# Patient Record
Sex: MALE | Race: BLACK OR AFRICAN AMERICAN | Employment: PART TIME | URBAN - METROPOLITAN AREA
[De-identification: names, ages, dates, MRNs, and addresses within clinical notes are randomized per-mention and may not be internally consistent; named-entity substitution may affect disease eponyms.]

---

## 2019-05-07 ENCOUNTER — OFFICE VISIT (OUTPATIENT)
Dept: FAMILY MEDICINE CLINIC | Age: 24
End: 2019-05-07

## 2019-05-07 VITALS
BODY MASS INDEX: 30.49 KG/M2 | HEIGHT: 70 IN | HEART RATE: 56 BPM | WEIGHT: 213 LBS | OXYGEN SATURATION: 97 % | RESPIRATION RATE: 16 BRPM | DIASTOLIC BLOOD PRESSURE: 95 MMHG | SYSTOLIC BLOOD PRESSURE: 132 MMHG | TEMPERATURE: 98.8 F

## 2019-05-07 DIAGNOSIS — S93.402A SPRAIN OF LEFT ANKLE, UNSPECIFIED LIGAMENT, INITIAL ENCOUNTER: Primary | ICD-10-CM

## 2019-05-07 NOTE — PATIENT INSTRUCTIONS
As many of you know, [place] is expanding Medicaid in January of 2019. Many residents who applied and were denied in the past will now qualify for Medicaid. Under the new system, qualification for non-disabled, non-pregnant adults will be based on income level. The income cut off is listed below, but the only way to know if you may qualify is to apply. You can submit an application online at [email or web address], in person at [employer], or by phone at [phone] Income cut off Single person                                     $16.754  or less per year       ($1,397 or less per month) Family of two                                     $22,715 or less per year         ($1,894 or less per month) Family of three                                   $28.677 or less per year         ($2.391 or less per month) Family of four                                     $34,638 or less per year         ($2,887 or less per month) Ankle Sprain: Rehab Exercises Your Care Instructions Here are some examples of typical rehabilitation exercises for your condition. Start each exercise slowly. Ease off the exercise if you start to have pain. Your doctor or physical therapist will tell you when you can start these exercises and which ones will work best for you. How to do the exercises \"Alphabet\" exercise 1. Trace the alphabet with your toe. This helps your ankle move in all directions. Side-to-side knee swing exercise 1. Sit in a chair with your foot flat on the floor. 2. Slowly move your knee from side to side. Keep your foot pressed flat. 3. Continue this exercise for 2 to 3 minutes. Towel curl 1. While sitting, place your foot on a towel on the floor. Scrunch the towel toward you with your toes. 2. Then use your toes to push the towel away from you. 3. To make this exercise more challenging you can put something on the other end of the towel. A can of soup is about the right weight for this. Towel stretch 1. Sit with your legs extended and knees straight. 2. Place a towel around your foot just under the toes. 3. Hold each end of the towel in each hand, with your hands above your knees. 4. Pull back with the towel so that your foot stretches toward you. 5. Hold the position for at least 15 to 30 seconds. 6. Repeat 2 to 4 times a session. Do up to 5 sessions a day. Ankle eversion exercise 1. Start by sitting with your foot flat on the floor. Push your foot outward against a wall or a piece of furniture that doesn't move. Hold for about 6 seconds, and relax. Repeat 8 to 12 times. 2. After you feel comfortable with this, try using rubber tubing looped around the outside of your feet for resistance. Push your foot out to the side against the tubing, and then count to 10 as you slowly bring your foot back to the middle. Repeat 8 to 12 times. Isometric opposition exercises 1. While sitting, put your feet together flat on the floor. 2. Press your injured foot inward against your other foot. Hold for about 6 seconds, and relax. Repeat 8 to 12 times. 3. Then place the heel of your other foot on top of the injured one. Push down with the top heel while trying to push up with your injured foot. Hold for about 6 seconds, and relax. Repeat 8 to 12 times. Resisted ankle inversion 1. Sit on the floor with your good leg crossed over your other leg. 2. Hold both ends of an exercise band and loop the band around the inside of your affected foot. Then press your other foot against the band. 3. Keeping your legs crossed, slowly push your affected foot against the band so that foot moves away from your other foot. Then slowly relax. 4. Repeat 8 to 12 times. Resisted ankle eversion 1. Sit on the floor with your legs straight. 2. Hold both ends of an exercise band and loop the band around the outside of your affected foot. Then press your other foot against the band. 3. Keeping your leg straight, slowly push your affected foot outward against the band and away from your other foot without letting your leg rotate. Then slowly relax. 4. Repeat 8 to 12 times. Resisted ankle dorsiflexion 1. Tie the ends of an exercise band together to form a loop. Attach one end of the loop to a secure object or shut a door on it to hold it in place. (Or you can have someone hold one end of the loop to provide resistance.) 2. While sitting on the floor or in a chair, loop the other end of the band over the top of your affected foot. 3. Keeping your knee and leg straight, slowly flex your foot to pull back on the exercise band, and then slowly relax. 4. Repeat 8 to 12 times. Single-leg balance 1. Stand on a flat surface with your arms stretched out to your sides like you are making the letter \"T. \" Then lift your good leg off the floor, bending it at the knee. If you are not steady on your feet, use one hand to hold on to a chair, counter, or wall. 2. Standing on the leg with your affected ankle, keep that knee straight. Try to balance on that leg for up to 30 seconds. Then rest for up to 10 seconds. 3. Repeat 6 to 8 times. 4. When you can balance on your affected leg for 30 seconds with your eyes open, try to balance on it with your eyes closed. 5. When you can do this exercise with your eyes closed for 30 seconds and with ease and no pain, try standing on a pillow or piece of foam, and repeat steps 1 through 4. Follow-up care is a key part of your treatment and safety. Be sure to make and go to all appointments, and call your doctor if you are having problems. It's also a good idea to know your test results and keep a list of the medicines you take. Where can you learn more? Go to http://chintan-yaw.info/. Abelardo Lindo in the search box to learn more about \"Ankle Sprain: Rehab Exercises. \" Current as of: September 20, 2018 Content Version: 11.9 © 6604-2592 [employer]. Care instructions adapted under license by [employer] ([employer]) (which disclaims liability or warranty for this information). If you have questions about a medical condition or this instruction, always ask your healthcare professional. [place] [address] any warranty or liability for your use of this information.

## 2019-05-07 NOTE — PROGRESS NOTES
Chief Complaint Patient presents with  Ankle Injury \"pt stated that he injury his ankle 4 years ago\"

## 2019-05-07 NOTE — PROGRESS NOTES
HPI 
Teja Recinos is a 20 y.o. male being seen today for Chief Complaint Patient presents with  Ankle Injury \"pt stated that he injury his ankle 4 years ago\" [place] IOV to this pt to care a Tahir Oquendo.   he states that 4 years ago he broke his foot/ankle and he was in a cast for awhile. He was lost to follow up but was doing ok for awhile. Augusto [place] it has been hurting him, especially in the evenings after working all day. At work he is walking, standing a lot and unloading trucks. Seen in ED recently and had xray showing nothing acute. Using naproxen bid. Hx AURORA and not on any treatment at this time. Dx at [facility] about 10 years ago and lost his cpap since then. Past Medical History:  
Diagnosis Date  AURORA (obstructive sleep apnea)   
 dx at [facility] ROS Patient states that he is feeling well. Denies complaints of chest pain, shortness of breath, swelling of legs, dizziness or weakness. he denies nausea, vomiting or diarrhea. No current outpatient medications on file. No current facility-administered medications for this visit. PE Visit Vitals BP (!) 132/95 (BP 1 Location: Left arm, BP Patient Position: Sitting) Pulse (!) 56 Temp 98.8 °F (37.1 °C) (Temporal) Resp 16 Ht 5' 10\" (1.778 m) Wt 213 lb (96.6 kg) SpO2 97% BMI 30.56 kg/m² Alert and oriented with normal mood and affect. he is well developed and well nourished Examination of the left lower extremity showed no visible abnormalities, redness, or trauma to the skin. There was no tenderness to palpation of the medial or lateral malleolus. No tenderness of the medial foot or heel, and no tenderness along the 5th metatarsal. Ankle active and passive  range of motion was grossly normal and without pain. Upon ankle extension and inversion patient notes that the stretched dorsal lateral aspect of the foot is where he typically feels pain. Gait normal .No results found for this or any previous visit. Assessment and Plan: ICD-10-CM ICD-9-CM 1. Sprain of left ankle, unspecified ligament, initial encounter S93.402A 845.00 Start home exercises for ankle Apply for medicaid Follow up after he knows if he will receive medicaid and we will place his referrals at that time, either through medicaid or [facility] financial assistance. Will need PT, PCP, sleep study Jalil Dsouza MD

## 2019-07-18 ENCOUNTER — TELEPHONE (OUTPATIENT)
Dept: FAMILY MEDICINE CLINIC | Age: 24
End: 2019-07-18

## 2019-07-18 ENCOUNTER — OFFICE VISIT (OUTPATIENT)
Dept: FAMILY MEDICINE CLINIC | Age: 24
End: 2019-07-18

## 2019-07-18 VITALS
OXYGEN SATURATION: 98 % | RESPIRATION RATE: 18 BRPM | HEART RATE: 52 BPM | SYSTOLIC BLOOD PRESSURE: 148 MMHG | TEMPERATURE: 98 F | BODY MASS INDEX: 30.84 KG/M2 | HEIGHT: 70 IN | WEIGHT: 215.4 LBS | DIASTOLIC BLOOD PRESSURE: 83 MMHG

## 2019-07-18 DIAGNOSIS — S93.402A SPRAIN OF LEFT ANKLE, UNSPECIFIED LIGAMENT, INITIAL ENCOUNTER: Primary | ICD-10-CM

## 2019-07-18 NOTE — PROGRESS NOTES
LALY Sandoval Yassine is a 20 y.o. male being seen today for   Chief Complaint   Patient presents with    Ankle Pain     Left   . follow up for this pt with chronic ankle pain/sprain. he states that he lost his paper with home exercises on it and would like a new copy. Also would like to just go ahead with PT. Thinks he will qualify for medicaid. Past Medical History:   Diagnosis Date    Asthma     AURORA (obstructive sleep apnea)     dx [address]  Patient states that he is feeling well. Denies complaints of chest pain, shortness of breath, swelling of legs, dizziness or weakness. he denies nausea, vomiting or diarrhea. Current Outpatient Medications   Medication Sig    naproxen (NAPROSYN) 375 mg tablet Take 1 Tab by mouth two (2) times daily (with meals). No current facility-administered medications for this visit. PE  Visit Vitals  /83 (BP 1 Location: Left arm, BP Patient Position: Sitting)   Pulse (!) 52   Temp 98 °F (36.7 °C) (Temporal)   Resp 18   Ht 5' 10\" (1.778 m)   Wt 215 lb 6.4 oz (97.7 kg)   SpO2 98%   BMI 30.91 kg/m²        Alert and oriented with normal mood and affect. he is well developed and well nourished . Lungs are clear without wheezing. Heart rate is regular without murmurs or gallops. There is no lower extremity edema. No results found for this or any previous visit. Assessment and Plan:        ICD-10-CM ICD-9-CM    1.  Sprain of left ankle, unspecified ligament, initial encounter S93.402A 845.00 REFERRAL TO PHYSICAL THERAPY     Place pt referral  Await medicaid    Javier Stinson MD

## 2019-07-18 NOTE — PATIENT INSTRUCTIONS
Ankle: Exercises  Your Care Instructions  Here are some examples of exercises for your ankle. Start each exercise slowly. Ease off the exercise if you start to have pain. Your doctor or physical therapist will tell you when you can start these exercises and which ones will work best for you. How to do the exercises  \"Alphabet\" exercise    1. Trace the alphabet with your toe. This helps your ankle move in all directions. Side-to-side knee swing exercise    1. Sit in a chair with your foot flat on the floor. 2. Slowly move your knee from side to side while keeping your foot pressed flat. 3. Continue this exercise for 2 to 3 minutes. Towel curl    1. While sitting, place your foot on a towel on the floor and scrunch the towel toward you with your toes. 2. Then use your toes to push the towel away from you. 3. Make this exercise more challenging by placing a weighted object, such as a soup can, on the other end of the towel. Towel stretch    1. Sit with your legs extended and knees straight. 2. Place a towel around your foot just under the toes. 3. Hold each end of the towel in each hand, with your hands above your knees. 4. Pull back with the towel so that your foot stretches toward you. 5. Hold the position for at least 15 to 30 seconds. 6. Repeat 2 to 4 times a session, up to 5 sessions a day. Ankle eversion exercise    1. Start by sitting with your foot flat on the floor and pushing it outward against an immovable object such as the wall or heavy furniture. Hold for about 6 seconds, then relax. Repeat 8 to 12 times. 2. After you feel comfortable with this, try using rubber tubing looped around the outside of your feet for resistance. Push your foot out to the side against the tubing, and then count to 10 as you slowly bring your foot back to the middle. Repeat 8 to 12 times. Isometric opposition exercises    1. While sitting, put your feet together flat on the floor.   2. Press your injured foot inward against your other foot. Hold for about 6 seconds, and relax. Repeat 8 to 12 times. 3. Then place the heel of your other foot on top of the injured one. Push down with the top heel while trying to push up with your injured foot. Hold for about 6 seconds, and relax. Repeat 8 to 12 times. Follow-up care is a key part of your treatment and safety. Be sure to make and go to all appointments, and call your doctor if you are having problems. It's also a good idea to know your test results and keep a list of the medicines you take. Where can you learn more? Go to http://chintan-yaw.info/. Enter W343 in the search box to learn more about \"Ankle: Exercises. \"  Current as of: September 20, 2018  Content Version: 11.9  © 4682-7240 [employer]. Care instructions adapted under license by [employer] (which disclaims liability or warranty for this information). If you have questions about a medical condition or this instruction, always ask your healthcare professional. [address] any warranty or liability for your use of this information.

## 2019-07-18 NOTE — TELEPHONE ENCOUNTER
Patient advised of appointment for physical therapy  [facility]  7/31/19 3:45 arrive 3:15 [address] [facility] [id number]

## 2019-07-18 NOTE — PROGRESS NOTES
Discharge instructions reviewed with patient    Medication list and understanding of medications reviewed with patient. OTC and herbal medications reviewed and added to med list if applicable  Barriers to adherence assessed. Guidance given regarding new medications this visit, including reason for taking this medicine, and common side effects. AVS given to patient. Explained to patient. Patient expressed understanding.

## 2019-07-31 ENCOUNTER — HOSPITAL ENCOUNTER (OUTPATIENT)
Dept: PHYSICAL THERAPY | Age: 24
Discharge: HOME | End: 2019-07-31
Payer: MEDICAID

## 2019-07-31 PROCEDURE — 97161 PT EVAL LOW COMPLEX 20 MIN: CPT

## 2019-07-31 PROCEDURE — 97110 THERAPEUTIC EXERCISES: CPT

## 2019-07-31 NOTE — PROGRESS NOTES
PHYSICAL THERAPY - DAILY TREATMENT NOTE    Patient Name: Cassy Mckeon        Date: 2019  : 1995   yes Patient  Verified  Visit #:   1     Insurance: Payor: SELF PAY / Plan: [employer] / Product Type: Self Pay /      In time: 3:53 Out time: 4:45   Total Treatment Time: 52     Medicare/[facility] Time Tracking (below)   Total Timed Codes (min):  na 1:1 Treatment Time:  na     TREATMENT AREA =  Left ankle pain [M25.572]    SUBJECTIVE  Pain Level (on 0 to 10 scale):  7/ 10   Medication Changes/New allergies or changes in medical history, any new surgeries or procedures?    no  If yes, update Summary List   Subjective Functional Status/Changes:  []  No changes reported     See POC          OBJECTIVE    12 min Therapeutic Exercise:  [x]  See flow sheet   Rationale:      increase ROM, increase strength, improve balance and increase proprioception to improve the patients ability to ambulate, stand prolonged periods of time     Billed With/As:   [x] TE   [] TA   [] Neuro   [] Self Care Patient Education: [x] Review HEP    [] Progressed/Changed HEP based on:   [] positioning   [] body mechanics   [] transfers   [] heat/ice application    [x] other: orthotic and footwear recommendations     Other Objective/Functional Measures:    See POC     Post Treatment Pain Level (on 0 to 10) scale:    10     ASSESSMENT  Assessment/Changes in Function:     See POC     []  See Progress Note/Recertification   Patient will continue to benefit from skilled PT services to modify and progress therapeutic interventions, address functional mobility deficits, address ROM deficits, address strength deficits, analyze and address soft tissue restrictions, analyze and cue movement patterns, analyze and modify body mechanics/ergonomics, assess and modify postural abnormalities, address imbalance/dizziness and instruct in home and community integration to attain remaining goals.    Progress toward goals / Updated goals:    See POC     PLAN  []  Upgrade activities as tolerated yes Continue plan of care   []  Discharge due to :    []  Other:      Therapist: Juani Zamarripa PT    Date: 7/31/2019 Time: 5:19 PM     Future Appointments   Date Time Provider Philomena Cast   8/16/2019 10:00 AM [facility] PT [facility] [place] 1 [facility]

## 2019-08-01 NOTE — PROGRESS NOTES
[address] [facility] AT [address], [address] [place] [address] Road  Phone: [phone]   [phone]  PLAN OF CARE / [address] [place] [address] PHYSICAL THERAPY SERVICES  Patient Name: Cassy Mckeon : 1995   Medical   Diagnosis: Sprain of L ankle [S93.402A] Treatment Diagnosis: Left ankle pain [M25.572]   Onset Date:      Referral Source: Leron Schirmer, MD [facility]): 2019   Prior Hospitalization: See medical history Provider #: [id number]   Prior Level of Function: Intermittent B ankle pain causing limited ability to participate in recreational activity   Comorbidities: asthma   Medications: Verified on Patient Summary List   The Plan of Care and following information is based on the information from the initial evaluation.   ===========================================================================================  Assessment / key information:  Pt is a 19 y/o M who presents to PT w/ c/o chronic L ankle pain that began in  after an IV ankle sprain while landing from a jump shot in basketball. Pt reports he was placed in a hard cast for 2-3 weeks following initial injury and was told he had made a full recovery. Pt notes continued pain, however, which has worsened over the last 2-3 months due to prolonged standing/walking for work. Pt reports going to ED about 2 months ago due to inc pain and swelling in the L lateral ankle- x-ray (-) for fx. Pt c/o pain ranging 0-10/10, made worse w/ prolonged standing, walking and when attempting to walk after prolonged sitting. C/o instability in the L ankle when ambulating on uneven surfaces and stairs. Localizes pain to L lateral ankle. Denies red flags. Clinical exam significant for:  OBSERVATION: Noted significant pes planus, B forefoot abduction, severe navicular drop B. Pt wearing non-supportive slip on sandals, reports wearing Vans sneakers to work for 8+ hour shift.   ROM: L ankle AROM 10 deg DF, 45 deg PF, 9 deg IV p!, 5 deg EV p! (R ankle AROM WNL all planes). Gastroc flexibility 0 deg DF B w/ knee extended  STRENGTH: ankle IV/EV L 4-/5 2' p!. Unable to perform full range SL HR. DL HR 15x  PALPATION: TTP to the L ATFL, gastroc/soleus, and peroneals  SPECIAL TEST: (+) anterior drawer L. SLS L 5\" w/ marked ankle, hip, and ultimately stepping strategy. FOTO 49/100. Pt sx consistent w/ lateral ankle sprain specifically involving the L ATFL. Pt educated on dx, prognosis, anatomy related to dx, POC, HEP, and footwear recommendations. Advised pt consider OTC or semi-custom orthotics to improve foot/ankle alignment.   ===========================================================================================  Eval Complexity: History LOW Complexity : Zero comorbidities / personal factors that will impact the outcome / POC;  Examination  MEDIUM Complexity : 3 Standardized tests and measures addressing body structure, function, activity limitation and / or participation in recreation ; Presentation MEDIUM Complexity : Evolving with changing characteristics ;   Decision Making MEDIUM Complexity : FOTO score of 26-74; Overall Complexity LOW   Problem List: pain affecting function, decrease ROM, decrease strength, impaired gait/ balance, decrease ADL/ functional abilitiies, decrease activity tolerance, decrease flexibility/ joint mobility and decrease transfer abilities   Treatment Plan may include any combination of the following: Therapeutic exercise, Therapeutic activities, Neuromuscular re-education, Physical agent/modality, Gait/balance training, Manual therapy, Patient education, Self Care training, Functional mobility training, Home safety training and Stair training  Patient / Family readiness to learn indicated by: asking questions, trying to perform skills and interest  Persons(s) to be included in education: patient (P)  Barriers to Learning/Limitations: yes;  financial  Measures taken, if barriers to learning: Pt w/o insurance coverage (actively pursuing Medicaid coverage) and will be self-pay rate. Pt states he cannot afford regular attendance. Established HEP, will f/u in 2 weeks to assess compliance and response and will progress at that point as needed   Patient Goal (s): \"my injury to get better\"   Patient self reported health status: excellent  Rehabilitation Potential: good   Short Term Goals: To be accomplished in  2  weeks:  1. Pt will be I and compliant w/ initial HEP  2. Improve L ankle IV/EV AROM to WNL in order to improve ankle mobility   Long Term Goals: To be accomplished in  4  weeks:  1. Pt will perform 15x SL HR full range on the L in order to improve ability to negotiate stairs and return to light athletic activity  2. Pt will maintain SLS on level ground x 20\" on the L in order to improve ankle stability for ambulation  3. Improve FOTO score to >/= 69/100 to indicate improved function    Frequency / Duration:  Recommend patient to be seen  2  times per week for 4  weeks, however pt may be limited in attendance due to financial/insurance reasons  Patient / Caregiver education and instruction: exercises and other footwear recommendations  Therapist Signature: Daysi Grider PT Date: 8/5/6317   Certification Period: na Time: 9:30 AM   ===========================================================================================  I certify that the above Physical Therapy Services are being furnished while the patient is under my care. I agree with the treatment plan and certify that this therapy is necessary. Physician Signature:        Date:       Time:     Please sign and return to [facility] or you may fax the signed copy to [phone]. Thank you.

## 2019-09-10 ENCOUNTER — HOSPITAL ENCOUNTER (OUTPATIENT)
Dept: PHYSICAL THERAPY | Age: 24
Discharge: HOME | End: 2019-09-10
Payer: MEDICAID

## 2019-09-10 PROCEDURE — 97110 THERAPEUTIC EXERCISES: CPT

## 2019-09-10 NOTE — PROGRESS NOTES
[address] [facility] PHYSICAL THERAPY  [address], [facility], [address] - Phone: [phone]  Fax: [phone]  PROGRESS NOTE  Patient Name: Croin Pihcardo : 1995   Treatment/Medical Diagnosis: Left ankle pain [M25.572]   Referral Source: Kaity Holguin MD     Date of Initial Visit: 19 Attended Visits: 2 Missed Visits: 1     SUMMARY OF TREATMENT  Physical therapy has consisted of therapeutic exercises for increased ankle/foot strength, ROM, and flexibility. Manual therapy for decreased muscle hypertonicity and increased ROM/flexibility. Pt education provided regarding HEP and orthotic use. CURRENT STATUS  Pt returns for PT today after 6 weeks off of therapy due to financial constraints (pt is self pay). He reports inconsistency with HEP exercises. Pt states he was able to obtain OTC orthotic which was helping with his pain, however he has recently stopped using due to concern for appropriate use (pt has been ed on continued use of orthotic at all times for optimal foot support). Pt c/o ongoing pain to L ankle/foot with additional pain to R ankle/foot which he feels is related to compensations with gait and activities. Pt states pain is worst with ambulation after prolonged sitting/sleeping. States he has not attempted running/gym activities. Current objective impairments:    Max pain 10/10 (after sitting/NWB for a period of time). Least 2-3/10. Average pain 7-8/10. Postural impairments: Significant L>R pes planus and bunion. TTP to plantar fascia, posterior tib, calcaneo-fib ligament, peroneals. Decreased flexibility to gastroc/soleus and plantar fascia.   FOTO 39 (-10 point change from [facility])          AROM                 PROM            Strength (1-5)   Right Left Right Left Right Left   Dorsiflexion 5 4 10 7 5 5   Plantarflexion 30 30* 30 30* NT 3+*   Inversion 34 10 35 10 5 4-   Eversion 5 5* 15 10* 4+ 3+*       Current functional limitations: Standing (tolerance ~ 2 hours), stairs, walking (uneven terrain), walking after prolonged NWB. Goal/Measure of Progress Goal Met? 1. Pt will be I and compliant w/ initial HEP   Status at last Eval:  Current Status: no no   2. Improve L ankle IV/EV AROM to WNL in order to improve ankle mobility   Status at last Eval: Inv 9*, ev 5* Current Status: Inv 10, ev 5* no     New Goals to be achieved in __4__  weeks:  1. Pt will perform 15x SL HR full range on the L in order to improve ability to negotiate stairs and return to light athletic activity  2. Pt will maintain SLS on level ground x 20\" on the L in order to improve ankle stability for ambulation  3. Improve FOTO score to >/= 69/100 to indicate improved function    RECOMMENDATIONS  Pt will benefit from skilled PT to address impairments and promote attainment of LTG's. Pt has been advised and educated on need for consistency with HEP and PT attendance to make optimal gains; due to financial constraints he is agreeable to 1 visit every 2 weeks. Will plan additional 4 weeks of PT at 1 visit every 2 weeks with planned re-assessment after 30 days. If you have any questions/comments please contact us directly at [phone]. Thank you for allowing us to assist in the care of your patient. Therapist Signature: Helena Dean, PT Date: 9/10/2019     Time: 5:24 PM   NOTE TO PHYSICIAN:  PLEASE COMPLETE THE ORDERS BELOW AND FAX TO   [facility]: [phone]  If you are unable to process this request in 24 hours please contact our office: [phone]    ___ I have read the above report and request that my patient continue as recommended.   ___ I have read the above report and request that my patient continue therapy with the following changes/special instructions:_________________________________________________________   ___ I have read the above report and request that my patient be discharged from therapy.      Physician Signature:        Date:       Time:

## 2019-09-10 NOTE — PROGRESS NOTES
PHYSICAL THERAPY - DAILY TREATMENT NOTE    Patient Name: Otto Cartwright        Date: 9/10/2019  : 1995   yes Patient  Verified  Visit #:   2   of   6-8  Insurance: Payor: SELF PAY / Plan: [facility] SELF PAY / Product Type: Self Pay /      In time: 5:16 PM Out time: 6:25   Total Treatment Time: 69     Medicare/[facility] Time Tracking (below)   Total Timed Codes (min):  n/a 1:1 Treatment Time:  n/a     TREATMENT AREA =  Left ankle pain [M25.572]    SUBJECTIVE  Pain Level (on 0 to 10 scale):  8  / 10   Medication Changes/New allergies or changes in medical history, any new surgeries or procedures?    no  If yes, update Summary List   Subjective Functional Status/Changes:  []  No changes reported     Pt c/o ongoing ankle pain; admits non-compliance with HEP. Wants to come in to see if exercises need to be changed.     See PN          OBJECTIVE  Modalities Rationale:     decrease inflammation and decrease pain to improve patient's ability to tolerate standing activities   min [] Estim, type/location:                                      []  att     []  unatt     []  w/US     []  w/ice    []  w/heat    min []  Mechanical Traction: type/lbs                   []  pro   []  sup   []  int   []  cont    []  before manual    []  after manual    min []  Ultrasound, settings/location:      min []  Iontophoresis w/ dexamethasone, location:                                               []  take home patch       []  in clinic   10 min [x]  Ice     []  Heat    location/position: L foot, reclined    min []  Vasopneumatic Device, press/temp:     min []  Other:    [x] Skin assessment post-treatment (if applicable):    [x]  intact    []  redness- no adverse reaction     []redness  adverse reaction:        55 min Therapeutic Exercise:  [x]  See flow sheet (-4 min bike)   Rationale:    increase ROM, increase strength, improve balance and increase proprioception to improve the patients ability to ambulate, stand prolonged periods of time           Billed With/As:   [x] TE   [] TA   [] Neuro   [] Self Care Patient Education: [x] Review HEP    [] Progressed/Changed HEP based on:   [] positioning   [] body mechanics   [] transfers   [] heat/ice application    [] other:      Other Objective/Functional Measures:    See PN    -new exercises added as per flow sheet with vc's provided for form/technique 100% of the time. Post Treatment Pain Level (on 0 to 10) scale:   9  / 10     ASSESSMENT  Assessment/Changes in Function:     See PN     []  See Progress Note/Recertification   Patient will continue to benefit from skilled PT services to modify and progress therapeutic interventions, address functional mobility deficits, address ROM deficits, address strength deficits, analyze and address soft tissue restrictions, analyze and cue movement patterns, analyze and modify body mechanics/ergonomics, assess and modify postural abnormalities, address imbalance/dizziness and instruct in home and community integration to attain remaining goals   Progress toward goals / Updated goals:    See PN     PLAN  []  Upgrade activities as tolerated yes Continue plan of care   []  Discharge due to :    []  Other:      Therapist: Salvador Hammond.  Naty Dill, PT    Date: 9/10/2019 Time: 4:34 PM     Future Appointments   Date Time Provider Philomena Cast   9/10/2019  5:15 PM Jeanne Florez, PT [facility]

## 2019-10-23 NOTE — PROGRESS NOTES
[address] [facility] PHYSICAL [facility]ERAPY AT [address], [address] [place] [address]  Phone: [phone]   [phone]    DISCHARGE NOTE  Patient Name: Fernie Rush : 1995   Treatment/Medical Diagnosis: Left ankle pain [M25.572]   Referral Source: Annel Fields MD     Date of Initial Visit: 19 Attended Visits: 2 Missed Visits: 2       SUMMARY OF TREATMENT  Pt attended initial evaluation and 1 follow-up appointments. Last appointment attended was 9/10/19 at which time a reassessment was completed. Unfortunately, patient failed to return for further treatment and is therefore discharged from our care at this time. A formal reassessment of goals was unable to be performed due to unplanned DC. RECOMMENDATIONS  Discontinue physical therapy due to patient not returning. If you have any questions/comments please contact us directly at [phone]. Thank you for allowing us to assist in the care of your patient. Therapist Signature: Rakesh Rogers.  DAKOTAH Dean Date: 10/23/19      Time: 1:03 PM

## 2023-01-31 RX ORDER — LIDOCAINE HYDROCHLORIDE 20 MG/ML
15 SOLUTION OROPHARYNGEAL PRN
COMMUNITY
Start: 2022-10-30

## 2023-01-31 RX ORDER — ALBUTEROL SULFATE 90 UG/1
2 AEROSOL, METERED RESPIRATORY (INHALATION) EVERY 4 HOURS PRN
COMMUNITY
Start: 2022-10-30